# Patient Record
Sex: MALE | Race: WHITE
[De-identification: names, ages, dates, MRNs, and addresses within clinical notes are randomized per-mention and may not be internally consistent; named-entity substitution may affect disease eponyms.]

---

## 2018-04-16 ENCOUNTER — HOSPITAL ENCOUNTER (OUTPATIENT)
Dept: HOSPITAL 77 - KA.SDS | Age: 83
Discharge: HOME | End: 2018-04-16
Attending: FAMILY MEDICINE
Payer: MEDICARE

## 2018-04-16 VITALS — DIASTOLIC BLOOD PRESSURE: 68 MMHG | SYSTOLIC BLOOD PRESSURE: 124 MMHG

## 2018-04-16 DIAGNOSIS — G56.22: Primary | ICD-10-CM

## 2018-04-16 DIAGNOSIS — Z88.8: ICD-10-CM

## 2018-04-16 DIAGNOSIS — Z79.899: ICD-10-CM

## 2018-04-16 DIAGNOSIS — E78.00: ICD-10-CM

## 2018-04-16 DIAGNOSIS — Z79.82: ICD-10-CM

## 2018-04-16 DIAGNOSIS — I12.9: ICD-10-CM

## 2018-04-16 DIAGNOSIS — Z88.0: ICD-10-CM

## 2018-04-16 DIAGNOSIS — I25.10: ICD-10-CM

## 2018-04-16 DIAGNOSIS — N18.3: ICD-10-CM

## 2018-04-16 DIAGNOSIS — Z91.040: ICD-10-CM

## 2018-04-16 RX ADMIN — HYDROCODONE BITARTRATE AND ACETAMINOPHEN ONE TAB: 10; 325 TABLET ORAL at 12:15

## 2018-04-16 NOTE — PCM.OPNOTE
- General Post-Op/Procedure Note


Date of Surgery/Procedure: 04/16/18


Operative Procedure(s): Cubital tunnel syndrome. Transposition of left ulnar 

nerve.


Findings: 


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 

syndrome. At surgery the ulnar nerve was felt to be quite thinned out as it 

transversed underneath the medial epicondyle. Successful transposition was 

accomplished using a fascial sheath.





Pre Op Diagnosis: Left cubital tunnel syndrome.


Post-Op Diagnosis: Same


Anesthesia Technique: General ET Tube, Local, Regional Block


Primary Surgeon: Tommie Menjivar


Complications: None


Condition: Good


Free Text/Narrative:: 


Preoperative diagnosis: Left cubital tunnel syndrome.


Postoperative diagnosis: As above


Procedure performed: Release  and transposition of ulnar nerve using fascial 

sheath.


Informed consent was obtained from patient regarding this procedure. All 

possible complications were thoroughly discussed with this patient. They 

include infection, bleeding, nerve injury, reoperation, failure of the 

operation to resolve his symptoms, and other unknown complications. The patient 

decided to proceed. He was taken to the operating room and kept in the supine 

position. A satisfactory [place] block anesthetic was applied to the left upper 

extremity after exsanguination. The extremity was thoroughly prepped and draped 

in the usual fashion. A vertical incision is made between the medial epicondyle 

and the olecranon process extending 4 inches above and 4 inches below. 

Subcutaneous dissection was performed. We went up against the proximal 

intermuscular septum, dissected it at the [place] ligament and found the 

ulnar nerve below it. They follow the ulnar nerve into the tract below the 

medial epicondyle. We then dissected the distal intermuscular septum and 

completely freed the ulnar nerve throughout its length. We also excised the 

Damon's ligament The nerve was felt to be quite thinned  below the medial 

epicondyle due to chronic stretch and due to some hypertrophy of the medial 

epicondyle. 2 small motor branches had to be sacrificed to bring the nerve 

anterior to the medial epicondyle. A fascial sheath was developed from the 

flexor muscle and the nerve was placed anterior to the medial epicondyle using 

the fascial sheath to prevent it from slipping back into the original position. 

3-0 Polysorb was used for this purpose. Small bleeders were cauterized. Wound 

was thoroughly irrigated. The tourniquet was let down. Additional bleeders were 

cauterized. A round Oral-Hardy drain was left in the wound. This was brought 

out through the inferior edge of the incision. The subcutaneous tissue and 

fascia were approximated using 3-0 Polysorb and stainless steel clips were used 

on the skin. Telfa pads and plenty of fluffy dressings were used followed by a 

3 inch bandage. The patient was transferred to the recovery room in an 

excellent condition. There were no operative complications. Sponge needle and 

instrument count were correct and blood loss was probably 10- 15 mL. Finger 

movements and sensations were intact at the end of the procedure.

## 2018-04-21 ENCOUNTER — HOSPITAL ENCOUNTER (EMERGENCY)
Dept: HOSPITAL 77 - KA.ED | Age: 83
Discharge: HOME | End: 2018-04-21
Payer: MEDICARE

## 2018-04-21 VITALS — DIASTOLIC BLOOD PRESSURE: 54 MMHG | SYSTOLIC BLOOD PRESSURE: 104 MMHG

## 2018-04-21 DIAGNOSIS — Z79.899: ICD-10-CM

## 2018-04-21 DIAGNOSIS — Z79.82: ICD-10-CM

## 2018-04-21 DIAGNOSIS — T81.4XXA: Primary | ICD-10-CM

## 2018-04-21 DIAGNOSIS — N28.9: ICD-10-CM

## 2018-04-21 DIAGNOSIS — T40.2X5A: ICD-10-CM

## 2018-04-21 DIAGNOSIS — Z88.0: ICD-10-CM

## 2018-04-21 DIAGNOSIS — Z87.891: ICD-10-CM

## 2018-04-21 DIAGNOSIS — K59.03: ICD-10-CM

## 2018-04-21 DIAGNOSIS — I10: ICD-10-CM

## 2018-04-21 DIAGNOSIS — Z91.040: ICD-10-CM

## 2018-04-21 LAB
CHLORIDE SERPL-SCNC: 109 MMOL/L (ref 98–115)
SODIUM SERPL-SCNC: 146 MMOL/L (ref 136–145)

## 2018-04-21 NOTE — PCM.HP
H&P History of Present Illness





- General


Date of Service: 04/21/18


Source of Information: Patient, Old Records, Other (Carmen HALEY)


History Limitations: Reports: No Limitations





- History of Present Illness


Initial Comments - Free Text/Narative: 





Cubital tunnel and ulnar nerve transposition performed 16th April Dr. Mary Menjivar

, initial drain with serosanguineous reviewed 17th again on the 19th. Drain is 

been pulled. Redness swelling firmness to the medial aspect has developed since 

then and erythema has migrated outside of the marked area of the 19th 

evaluation.





Has been placed on sulfamethoxazole trimethoprim double strength twice a day 

and received 1 dose of azithromycin 500 mg. As continued to become more 

problematic with firmness redness and discomfort.





He has had associated constipation secondary of narcotic use for pain 

management and has stated that was not worth the effort, not taking that in the 

past days due to constipation


Onset of Symptoms: Reports: Gradual


Duration of Symptoms: Reports: Day(s):, Getting Worse


Location: Reports: Upper Extremity, Left


Quality: Reports: Ache, Pressure


Improves with: Reports: None


Worsens with: Reports: Movement


Context: Reports: Other (Cubital tunnel and ulnar nerve transposition performed 

16th of April)


Associated Symptoms: Reports: No Other Symptoms, Other (Constipation at first 

with opioid pain management)


  ** Left Elbow


Pain Score (Numeric/FACES): 6





- Related Data


Allergies/Adverse Reactions: 


 Allergies











Allergy/AdvReac Type Severity Reaction Status Date / Time


 


Penicillins Allergy Unknown Swollen Verified 04/21/18 09:11





   Tongue  


 


ACE Inhibitors Allergy  Cough Verified 04/21/18 09:11


 


latex Allergy  Swelling Verified 04/21/18 09:11











Home Medications: 


 Home Meds





Gabapentin 300 mg PO BID 06/09/14 [History]


Hydroxychloroquine [Plaquenil] 200 mg PO TID 06/09/14 [History]


Losartan [Cozaar] 100 mg PO DAILY 06/09/14 [History]


Simvastatin 20 mg PO DAILY 06/09/14 [History]


Acetaminophen 500 mg PO Q4H 05/29/15 [History]


Allopurinol 150 mg PO DAILY 05/29/15 [History]


Aspirin [Halfprin] 81 mg PO DAILY 05/29/15 [History]


Clotrimazole/Betamethasone Dip [Lotrisone Cream] 1 applic TOP DAILY PRN 05/29/

15 [History]


Rup Rub 1 applic TOP BID 05/29/15 [History]


predniSONE [Prednisone] 5 mg PO DAILY PRN 05/29/15 [History]











Past Medical History


HEENT History: Reports: None


Cardiovascular History: Reports: Hypertension


Musculoskeletal History: Reports: Gout, RA


Oncologic (Cancer) History: Reports: Bladder





- Past Surgical History


Musculoskeletal Surgical History: Reports: Knee Replacement, Nerve Relocation (

16 April 2018), Shoulder Surgery


Other Oncologic Surgeries/Procedures: April 2017--had 2 small tumors removed 

from bladder





Social & Family History





- Family History


Family Medical History: Unobtainable





- Tobacco Use


Smoking Status *Q: Former Smoker


Years of Tobacco use: 20


Used Tobacco, but Quit: Yes


Month/Year Tobacco Last Used: 1/1/1980


Second Hand Smoke Exposure: No





- Caffeine Use


Caffeine Use: Reports: None





- Alcohol Use


Days Per Week of Alcohol Use: 6


Number of Drinks Per Day: 1


Total Drinks Per Week: 6





- Recreational Drug Use


Recreational Drug Use: No





H&P Review of Systems





- Review of Systems:


Review Of Systems: See Below


General: Reports: Fever


HEENT: Reports: No Symptoms


Pulmonary: Reports: No Symptoms


Cardiovascular: Reports: No Symptoms


Gastrointestinal: Reports: No Symptoms, Constipation


Genitourinary: Reports: No Symptoms (Improved after stopping opioid)


Musculoskeletal: Reports: Arm Pain


Skin: Reports: Bruising, Erythema, Other (Increased redness discomfort and mild 

edema to the left elbow region post)


Psychiatric: Reports: No Symptoms


Neurological: Reports: No Symptoms


Hematologic/Lymphatic: Reports: No Symptoms


Immunologic: Reports: No Symptoms





Exam





- Exam


Exam: See Below





- Vital Signs


Vital Signs: 


 Last Vital Signs











Temp  36.6 C   04/21/18 08:42


 


Pulse  80   04/21/18 08:42


 


Resp  20   04/21/18 08:42


 


BP  104/54 L  04/21/18 08:42


 


Pulse Ox  93 L  04/21/18 08:42











Weight: 76.204 kg





- Exam


General: Alert, Oriented


HEENT: Conjunctiva Clear, Mucosa Moist & Pink


Neck: Supple, Trachea Midline


Lungs: Clear to Auscultation


Cardiovascular: Regular Rate, Regular Rhythm


GI/Abdominal Exam: Normal Bowel Sounds


 (Male) Exam: Deferred


Rectal (Males) Exam: Deferred


Extremities: No: Normal Inspection (Left upper extremity shows erythema that is 

migrated outside of the previous marking performed in the clinic on the 19th. 

There is firmness to the medial aspect of the medial olecranon with edema. 

Scattered ecchymotic regions secondary of the procedure are noted. The staples 

are intact and clean does not appear to be any losing or drainage of the 

incision line.Motion and sensation are intact distally but he complains of a 

numbness feeling to the fourth fifth digits. Radial pulses intact, capillary 

refill 2 seconds. Noted previous traumatic issues to the distal tips of digits 

2 and 5.)





- Patient Data


Result Diagrams: 


 04/21/18 09:05





 04/21/18 09:05





- Problem List


(1) Cellulitis, wound, post-operative


SNOMED Code(s): 554593154, 739422233


   ICD Code: T81.4XXA - INFECTION FOLLOWING A PROCEDURE, INITIAL ENCOUNTER   

Status: Acute   Priority: High   Current Visit: Yes   Problem Details: With 

decreased renal function we will implement 1 g cefipime IV now with 500 mL 

saline for hydration purposes. Difficulty for transportation twice daily we 

will implement 1 gram cephtriaxone with  Plan will be to return Sunday and 

Monday for additional dosing and follow-up as scheduled on Tuesday 24 April at 

clinic. Extension of infectious margins are marked today and will be 

reevaluated   


Qualifiers: 


   Encounter type: initial encounter   Qualified Code(s): T81.4XXA - Infection 

following a procedure, initial encounter   





(2) Renal function impairment


SNOMED Code(s): 701116175, 554659454


   ICD Code: N28.9 - DISORDER OF KIDNEY AND URETER, UNSPECIFIED   Status: 

Chronic   Priority: High   Current Visit: Yes   Problem Details: Encouraged 

oral hydration in conjunction with the IV antibiotic and IV fluid provided 

today.   





(3) Constipation


SNOMED Code(s): 48621232


   ICD Code: K59.00 - CONSTIPATION, UNSPECIFIED   Status: Acute   Priority: 

Medium   Current Visit: Yes   


Qualifiers: 


   Constipation type: drug induced constipation   Qualified Code(s): K59.03 - 

Drug induced constipation   





(4) Constipation due to opioid therapy


SNOMED Code(s): 384894777297128


   ICD Code: K59.03 - DRUG INDUCED CONSTIPATION; T40.2X5A - ADVERSE EFFECT OF 

OTHER OPIOIDS, INITIAL ENCOUNTER   Status: Acute   Priority: Medium   Current 

Visit: Yes   Problem Details: Advise continuing with medication as needed may 

take half tablet if pain is not severe and to use Colace 2 capsules daily and 

may increase to 3 if not showing improvement with stool softening. Increasing 

fluid will benefit and high fiber diet   


Problem List Initiated/Reviewed/Updated: Yes


Assessment/Plan Comment:: 





Plan to return tomorrow 22 April 2018 for 1 g Rocephin IV.


We will send orders to the [facility] for Rocephin administration on Monday 

the 23rd and he will continue with his scheduled appointment for reassessment 

on the 22nd and determination for continuation of IV antibiotic or switching to 

oral would be given at that time per [facility] staff.

## 2018-07-15 ENCOUNTER — HOSPITAL ENCOUNTER (EMERGENCY)
Dept: HOSPITAL 77 - KA.ED | Age: 83
Discharge: NURSING, REHAB OR LONG-TERM CARE | End: 2018-07-15
Payer: MEDICARE

## 2018-07-15 DIAGNOSIS — Z91.040: ICD-10-CM

## 2018-07-15 DIAGNOSIS — I25.9: ICD-10-CM

## 2018-07-15 DIAGNOSIS — Z79.899: ICD-10-CM

## 2018-07-15 DIAGNOSIS — Z88.0: ICD-10-CM

## 2018-07-15 DIAGNOSIS — I44.1: Primary | ICD-10-CM

## 2018-07-15 DIAGNOSIS — I11.9: ICD-10-CM

## 2018-07-15 DIAGNOSIS — R79.89: ICD-10-CM

## 2018-07-15 DIAGNOSIS — Z79.82: ICD-10-CM

## 2018-07-15 LAB
ANION GAP SERPL CALC-SCNC: 12 MMOL/L (ref 5–15)
CHLORIDE SERPL-SCNC: 106 MMOL/L (ref 98–115)
SODIUM SERPL-SCNC: 139 MMOL/L (ref 136–145)

## 2018-07-15 NOTE — EDM.PDOC
ED HPI GENERAL MEDICAL PROBLEM





- General


Chief Complaint: Cardiovascular Problem


Stated Complaint: DIZZY


Time Seen by Provider: 07/15/18 19:40


Source of Information: Reports: Patient


History Limitations: Reports: No Limitations





- History of Present Illness


INITIAL COMMENTS - FREE TEXT/NARRATIVE: 





84 YO WM presents to ER with episode of near syncope which began tonight. Pt 

reports he was mowing his lawn on a rider mower and after finishing he stood up 

and became dizzy with near syncope. Pt denies any chest pain, shortness of 

breath, nausea/vomiting or diaphoresis. Pt reports he's had some minor episodes 

of dizziness upon standing over the last 2 weeks. Pt denies any history of 

cardiovascular complaints or rhythm abnormalities.  


Onset: Today


Onset Date: 07/15/18


Onset Time: 19:00


Duration: Day(s): (1)


Location: Reports: Generalized


Severity: Moderate


Improves with: Reports: Rest


Worsens with: Reports: Movement


Associated Symptoms: Reports: Weakness.  Denies: Confusion, Chest Pain, 

Diaphoresis, Nausea/Vomiting, Shortness of Breath, Syncope





- Related Data


 Allergies











Allergy/AdvReac Type Severity Reaction Status Date / Time


 


Penicillins Allergy Unknown Swollen Verified 07/15/18 20:09





   Tongue  


 


ACE Inhibitors Allergy  Cough Verified 07/15/18 20:09


 


latex Allergy  Swelling Verified 07/15/18 20:09











Home Meds: 


 Home Meds





Gabapentin 300 mg PO BID 06/09/14 [History]


Hydroxychloroquine [Plaquenil] 200 mg PO TID 06/09/14 [History]


Losartan [Cozaar] 25 mg PO DAILY 06/09/14 [History]


Simvastatin 20 mg PO DAILY 06/09/14 [History]


Acetaminophen 500 mg PO Q4H 05/29/15 [History]


Allopurinol 150 mg PO DAILY 05/29/15 [History]


Aspirin [Halfprin] 81 mg PO DAILY 05/29/15 [History]


Clotrimazole/Betamethasone Dip [Lotrisone Cream] 1 applic TOP DAILY PRN 05/29/

15 [History]


Rup Rub 1 applic TOP BID 05/29/15 [History]


predniSONE [Prednisone] 5 mg PO DAILY PRN 05/29/15 [History]











Past Medical History


HEENT History: Reports: None


Cardiovascular History: Reports: Hypertension


Musculoskeletal History: Reports: Gout, RA


Oncologic (Cancer) History: Reports: Bladder





- Past Surgical History


Musculoskeletal Surgical History: Reports: Knee Replacement, Nerve Relocation, 

Shoulder Surgery


Other Oncologic Surgeries/Procedures: April 2017--had 2 small tumors removed 

from bladder





Social & Family History





- Family History


Family Medical History: Unobtainable





- Caffeine Use


Caffeine Use: Reports: None





ED ROS GENERAL





- Review of Systems


Review Of Systems: See Below


Constitutional: Reports: No Symptoms


HEENT: Reports: No Symptoms


Respiratory: Reports: No Symptoms


Cardiovascular: Reports: Lightheadedness.  Denies: Chest Pain, Blood Pressure 

Problem, Palpitations


Endocrine: Reports: No Symptoms


GI/Abdominal: Reports: No Symptoms


: Reports: No Symptoms


Musculoskeletal: Reports: No Symptoms


Skin: Reports: No Symptoms


Neurological: Reports: No Symptoms


Psychiatric: Reports: No Symptoms


Hematologic/Lymphatic: Reports: No Symptoms


Immunologic: Reports: No Symptoms





ED EXAM, DIZZINESS





- Physical Exam


Exam: See Below


Exam Limited By: No Limitations


General Appearance: Alert, WD/WN, No Apparent Distress


Eye Exam: Bilateral Eye: EOMI, PERRL


Throat/Mouth: Normal Inspection, Normal Lips, Normal Teeth, Normal Gums, Normal 

Oropharynx, Normal Voice, No Airway Compromise


Head Exam: Atraumatic, Normocephalic


Neck: Normal Inspection, Supple, Non-Tender, Full Range of Motion


Respiratory/Chest: No Respiratory Distress, Lungs Clear, Normal Breath Sounds, 

No Accessory Muscle Use, Chest Non-Tender


Cardiovascular: Normal Peripheral Pulses, No Edema, No Gallop, No JVD, No Murmur

, No Rub, Bradycardia.  No: Irregularly Irregular


GI/Abdominal: Normal Bowel Sounds, Soft, Non-Tender, No Organomegaly, No 

Distention, No Abnormal Bruit, No Mass


Neurological: Alert, Normal Mood/Affect, Normal Dorsiflexion, CN II-XII Intact, 

Normal Plantar Flexion, Normal Gait, Normal Reflexes, No Motor/Sensory Deficits

, Oriented x 3


Back Exam: Normal Inspection, Full Range of Motion, NT


Extremities: Normal Inspection, Normal Range of Motion, Non-Tender, No Pedal 

Edema, Normal Capillary Refill


Psychiatric: Normal Affect, Normal Mood


Skin Exam: Warm, Dry, Intact, Normal Color, No Rash





EKG INTERPRETATION


EKG Date: 07/15/18


Time: 19:50


Rhythm: NSR


Rate (Beats/Min): 44


P-Wave: Variable


QRS: RBBB


ST-T: Normal


QT: Normal


Comparison: NA - No Prior EKG





Course





- Orders/Labs/Meds


Orders: 


 Active Orders 24 hr











 Category Date Time Status


 


 Cardiac Monitoring [RC] .AS DIRECTED Care  07/15/18 19:46 Active


 


 EKG Documentation Completion [RC] ASDIRECTED Care  07/15/18 19:48 Active


 


 Oxygen Therapy Adult [Oxygen Therapy, ED] [RC] Care  07/15/18 19:46 Active





 ASDIRECTED   


 


 Peripheral IV Care [RC] .AS DIRECTED Care  07/15/18 19:46 Active


 


 Chest 1V Frontal [CR] Stat Exams  07/15/18 19:46 Ordered


 


 EKG 12 Lead [EK] Routine Ther  07/15/18 19:46 Ordered











Labs: 


 Laboratory Tests











  07/15/18 07/15/18 07/15/18 Range/Units





  20:10 20:10 20:10 


 


WBC  7.7    (5.0-10.0)  10^3/uL


 


RBC  4.48 L    (4.50-6.00)  10^6/uL


 


Hgb  13.5    (13.0-17.0)  g/dL


 


Hct  42.4    (40.0-52.0)  %


 


MCV  94.8 H    (82.0-92.0)  fL


 


MCH  30.3    (27.0-31.0)  pg


 


MCHC  31.9 L    (32.0-36.0)  g/dL


 


RDW  14.1    (11.5-14.5)  %


 


Plt Count  189    (150-300)  10^3/uL


 


MPV  7.5    (7.4-10.4)  fL


 


Neut % (Auto)  58.1    (50.0-70.0)  %


 


Lymph % (Auto)  30.7    (20.0-40.0)  %


 


Mono % (Auto)  10.0 H    (2.0-8.0)  %


 


Eos % (Auto)  0.5 L    (1.0-3.0)  %


 


Baso % (Auto)  0.7    (0.0-1.0)  %


 


Neut # (Auto)  4.4    (2.5-7.0)  10^3/uL


 


Lymph # (Auto)  2.4    (1.0-4.0)  10^3/uL


 


Mono # (Auto)  0.8    (0.1-0.8)  10^3/uL


 


Eos # (Auto)  0.0 L    (0.1-0.3)  10^3/uL


 


Baso # (Auto)  0.1    (0.0-0.1)  10^3/uL


 


PT   9.7   (8.9-11.4)  SEC


 


INR   1.0   (0.9-1.1)  


 


APTT   25.9   (20.8-31.2)  SEC


 


Sodium    139  (136-145)  mmol/L


 


Potassium    4.4  (3.3-5.3)  mmol/L


 


Chloride    106  ()  mmol/L


 


Carbon Dioxide    25.4  (21.0-32.0)  mmol/L


 


Anion Gap    12.0  (5-15)  mmol/L


 


BUN    45 H  (6-25)  mg/dL


 


Creatinine    2.18 H  (0.51-1.17)  mg/dL


 


Est Cr Clr Drug Dosing    TNP  


 


Estimated GFR (MDRD)    29  mL/min


 


Glucose    102  mg/dL


 


Calcium    8.6 L  (8.7-10.3)  mg/dL


 


Total Bilirubin    0.2  (0.2-1.0)  mg/dL


 


AST    90 H  (15-37)  U/L


 


ALT    91 H  (12-78)  U/L


 


Alkaline Phosphatase    60  ()  IU/L


 


Creatine Kinase    369 H*  ()  U/L


 


CK-MB (CK-2)    13.80 H*  (0.00-4.30)  ng/mL


 


Troponin I      (0.00-0.070)  ng/mL


 


B-Natriuretic Peptide    193 H  (0-100)  pg/mL


 


Total Protein    6.2 L  (6.4-8.2)  g/dL


 


Albumin    3.16  (3.00-4.80)  g/dL














- Radiology Interpretation


Free Text/Narrative:: 





CXR- NAD





- Re-Assessments/Exams


Free Text/Narrative Re-Assessment/Exam: 





07/15/18 21:15


Pt has been asymptomatic while in ER. Pt has had stable BP- currently 117/55 

and denies any chest pain or shortness of breath at this time. Pt has been 

attached to external pacer while in ER. Awaiting transfer to [facility] for 

cardiac evaluation and intervention.








Departure





- Departure


Time of Disposition: 21:30


Disposition: DC/Tfer to Acute Hospital [facility]


Condition: Serious


Clinical Impression: 


 Bradycardia, Elevated troponin I level, Cardiac ischemia, Dizziness, Heart 

block AV second degree








- Discharge Information


Referrals: 


Tommie Menjivar MD [Primary Care Provider] - 


Forms:  ED Department Discharge, Interfacility Transfer [facility]





- My Orders


Last 24 Hours: 


My Active Orders





07/15/18 19:46


Cardiac Monitoring [RC] .AS DIRECTED 


Oxygen Therapy Adult [Oxygen Therapy, ED] [RC] ASDIRECTED 


Peripheral IV Care [RC] .AS DIRECTED 


Chest 1V Frontal [CR] Stat 


EKG 12 Lead [EK] Routine 





07/15/18 19:48


EKG Documentation Completion [RC] ASDIRECTED 














- Assessment/Plan


Last 24 Hours: 


My Active Orders





07/15/18 19:46


Cardiac Monitoring [RC] .AS DIRECTED 


Oxygen Therapy Adult [Oxygen Therapy, ED] [RC] ASDIRECTED 


Peripheral IV Care [RC] .AS DIRECTED 


Chest 1V Frontal [CR] Stat 


EKG 12 Lead [EK] Routine 





07/15/18 19:48


EKG Documentation Completion [RC] ASDIRECTED 











Assessment:: 





1. Bradycardia


2. 2nd degree AV block type II


3. Dizziness


4. Cardiac ischemia with elevated Trop I 0.54

## 2018-07-16 VITALS — SYSTOLIC BLOOD PRESSURE: 126 MMHG | DIASTOLIC BLOOD PRESSURE: 54 MMHG

## 2019-10-07 NOTE — PCM.OPNOTE
- General Post-Op/Procedure Note


Date of Surgery/Procedure: 10/07/19


Operative Procedure(s): Rt Carpal Tunnel Decompression.


Findings: 


Patient has been having symptoms of right carpal tunnel syndrome. After trial 

of conservative therapy he is being asked to come in today for carpal tunnel 

decompression.





Pre Op Diagnosis: Right carpal tunnel syndrome.


Post-Op Diagnosis: As above.


Anesthesia Technique: Moderate Sedation, Regional Block


Primary Surgeon: Tommie Menjivar


Condition: Good


Free Text/Narrative:: 


INFORMED CONSENT: Patient is here today for elective colonoscopy.  All aspects 

of this procedure have been discussed with the patient.  All complications were 

discussed.  Anesthetic complications were handled by anesthesia department.  

The patient understands fully well.  Patient did not have any further questions 

for me at the end of my interview.  The patient wishes for me to proceed.





PROCEDURE: Patient was kept in the supine position and the satisfactory Harvinder 

block anesthesia was administered. The Rt  arm was thoroughly prepped and 

draped in the usual fashion.  The tourniquet was placed to the rt upper arm and 

a vertical incision was made in the palm directly distal to the distal wrist 

crease.  The skin incision was deepened through the subcutaneous tissue, the 

palmar aponeurosis was incised and then we came down upon the transverse carpal 

ligament, which was opened along the line of the skin incision.  Extreme care 

was taken to protect the median nerve below.  Careful neurolysis was performed 

meticulously.  Approximately 2 cc of Marcaine 0.5% was instilled into the wound 

and skin was closed using mattress sutures with 3.0 Nylon.  The tourniquet was 

released.  A sterile pressure dressing was applied.  The patient tolerated the 

procedure well and was transferred to the recovery room in excellent condition.